# Patient Record
Sex: MALE | Race: WHITE | NOT HISPANIC OR LATINO | Employment: FULL TIME | URBAN - METROPOLITAN AREA
[De-identification: names, ages, dates, MRNs, and addresses within clinical notes are randomized per-mention and may not be internally consistent; named-entity substitution may affect disease eponyms.]

---

## 2021-05-07 ENCOUNTER — TELEPHONE (OUTPATIENT)
Dept: OBGYN CLINIC | Facility: MEDICAL CENTER | Age: 26
End: 2021-05-07

## 2021-05-07 ENCOUNTER — TELEPHONE (OUTPATIENT)
Dept: OBGYN CLINIC | Facility: HOSPITAL | Age: 26
End: 2021-05-07

## 2021-05-07 NOTE — TELEPHONE ENCOUNTER
Dr Miles Howe    # [phone]    New patient with a right hip labrum tear  DOI: 3/16  He is 25 would you be willing to see him? Please advise

## 2021-05-07 NOTE — TELEPHONE ENCOUNTER
Called pt to set up an appointment with Dr Yarely Chowdhury  Called [phone] and asked for the patient by name  Person answering phone stated that he was just a stranger he was not [facility] and I had the wrong number

## 2021-05-07 NOTE — TELEPHONE ENCOUNTER
Patients workers comp carrier called in wanting to make an appointment for this patient to be seen but could not answer COVID questions will call back with patient on the line

## 2021-05-12 NOTE — TELEPHONE ENCOUNTER
Janet Olvera, Nurse , called in conference with patient to schedule appointment with Dr Kapoor Roles  Patient's phone number has been updated in [address]  Appointment schedule for Monday, 5/17

## 2021-05-17 ENCOUNTER — APPOINTMENT (OUTPATIENT)
Dept: RADIOLOGY | Facility: MEDICAL CENTER | Age: 26
End: 2021-05-17
Payer: OTHER MISCELLANEOUS

## 2021-05-17 ENCOUNTER — OFFICE VISIT (OUTPATIENT)
Dept: OBGYN CLINIC | Facility: MEDICAL CENTER | Age: 26
End: 2021-05-17
Payer: OTHER MISCELLANEOUS

## 2021-05-17 VITALS
DIASTOLIC BLOOD PRESSURE: 82 MMHG | WEIGHT: 178.2 LBS | TEMPERATURE: 98 F | BODY MASS INDEX: 34.99 KG/M2 | SYSTOLIC BLOOD PRESSURE: 130 MMHG | HEART RATE: 79 BPM | HEIGHT: 60 IN

## 2021-05-17 DIAGNOSIS — S73.191A TEAR OF RIGHT ACETABULAR LABRUM, INITIAL ENCOUNTER: Primary | ICD-10-CM

## 2021-05-17 DIAGNOSIS — M25.551 PAIN IN RIGHT HIP: ICD-10-CM

## 2021-05-17 PROCEDURE — 73502 X-RAY EXAM HIP UNI 2-3 VIEWS: CPT

## 2021-05-17 PROCEDURE — 99203 OFFICE O/P NEW LOW 30 MIN: CPT | Performed by: ORTHOPAEDIC SURGERY

## 2021-05-17 NOTE — LETTER
May 17, 2021     Patient: González Davis   YOB: 1995   Date of Visit: 5/17/2021       To Whom it May Concern:    González Child is under my professional care  He was seen in my office on 5/17/2021  He may return to work on on sedentary duty only at this time       If you have any questions or concerns, please don't hesitate to call           Sincerely,          Kojo Rico DO        CC: No Recipients

## 2021-05-17 NOTE — PROGRESS NOTES
Assessment/Plan     1  Tear of right acetabular labrum, initial encounter    2  Pain in right hip      Orders Placed This Encounter   Procedures    XR hip/pelv 2-3 vws right if performed    Ambulatory referral to Physical Therapy    Ambulatory referral to Orthopedic Surgery     · Patient has right hip labrum tear  · Discussed with patient conservative treatments: physical therapy, medications and steroid injection  · Physical therapy order was given out today for the right hip   Prescribed patient Diclofenac prn pain, medications warnings were reviewed with patient  Add in Tylenol 1000 mg every 8 hours as needed for pain  Do not exceed 3000 mg day   Referred patient to Dr Iram Garcia   · He declined right hip steroid injection order   · Work not given out today : may return to work on sedentary duty  Return in about 6 weeks (around 6/28/2021) for Right hip pain  with Dr Iram Garcia   I answered all of the patient's questions during the visit and provided education of the patient's condition during the visit  The patient verbalized understanding of the information given and agrees with the plan  This note was dictated using Rem[employer] software  It may contain errors including improperly dictated words  Please contact physician directly for any questions  History of Present Illness   Chief complaint:   Chief Complaint   Patient presents with    Right Hip - Pain       HPI: Frandy Weldon is a 31 y o  male that c/o right hip pain  He was referred by his [employer]  He states he had injury at work on March 16th and 2021 (  [address])  Patient states 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 and his right hip shifted to the right and felt a sharp tear /pop sensation  Patient states a week later he was seen at [facility] and had x-rays taken of the right hip which showed no acute fractures and was placed at work for two weeks    He also was ordered physical therapy and states he went for three weeks and states that cause increased pain  Patient return back to Urgent Care at [facility] and had MRI right hip ordered which showed a labral tear  He states he is having a sharp pain that comes and goes over the anterior ( deep) aspect of the right hip  He denies any groin pain or radiating leg pain  Pain is worse with standing, walking, and going down steps  He denies any pain at rest   He previously was taking ibuprofen 800 mg with some relief  He is now taking over-the-counter Motrin with some relief  He is currently at a work per [facility] urgent care physician  He has no history having any injections or surgeries on the right hip  ROS:    See HPI for musculoskeletal review  All other systems reviewed are negative     Historical Information   No past medical history on file  No past surgical history on file  Social History   Social History     Substance and Sexual Activity   Alcohol Use Not on file     Social History     Substance and Sexual Activity   Drug Use Not on file     Social History     Tobacco Use   Smoking Status Not on file     Family History: No family history on file  No current outpatient medications on file prior to visit  No current facility-administered medications on file prior to visit  No Known Allergies    Objective   Vitals: Blood pressure 130/82, pulse 79, temperature 98 °F (36 7 °C), height 5' (1 524 m), weight 80 8 kg (178 lb 3 2 oz)  ,Body mass index is 34 8 kg/m²  PE:  AAOx 3  WDWN  Hearing intact, no drainage from eyes  Regular rate  no audible wheezing  no abdominal distension  LE compartments soft, skin intact    right hip:   No dislocation/deformity   Neg  SLR   Neg  Stinchfield  ROM: FF 0-140  ER 0-60 with orly pain   IR 0-30 with no pain   Neg  Sammy Test  +  Impingement test  No TTP over greater trochanter  Abduction: 5/5/ pain with resistant abduction   Neg   Sunita's test  No TTP over SIJ      Back:    No TTP over lumbar spinous processes, paraspinal musculature  SLR: Neg        Imaging Studies: I have personally reviewed pertinent films in PACS  righthip:   No acute osseous abnormality,   Undercoverage  of the femoral head suggesting mild hip dysplasia    MRI right hip: labral tear       Scribe Attestation    I,:  Kai Huggins am acting as a scribe while in the presence of the attending physician :       I,:  Jim Guajardo,  personally performed the services described in this documentation    as scribed in my presence :

## 2021-05-18 ENCOUNTER — TELEPHONE (OUTPATIENT)
Dept: OBGYN CLINIC | Facility: MEDICAL CENTER | Age: 26
End: 2021-05-18

## 2021-05-18 NOTE — TELEPHONE ENCOUNTER
Patient sees Dr Osmel Stokes at [employer] on work notes, faxed to [phone] completed

## 2021-05-19 ENCOUNTER — TELEPHONE (OUTPATIENT)
Dept: OBGYN CLINIC | Facility: HOSPITAL | Age: 26
End: 2021-05-19

## 2021-05-19 NOTE — TELEPHONE ENCOUNTER
Patient states job is requesting new note with specific duties he can perform  He installs billboards for a living  They want to know exactly he can or can't do  Please call when ready  They are having trouble finding work for him and he would have to stay home otherwise

## 2021-05-19 NOTE — TELEPHONE ENCOUNTER
Spoke with patient  Provided new work note  Margaret Tadeo, can you please send to his email [email or web address]  thanks!

## 2021-05-20 ENCOUNTER — TELEPHONE (OUTPATIENT)
Dept: OBGYN CLINIC | Facility: HOSPITAL | Age: 26
End: 2021-05-20

## 2021-05-20 NOTE — TELEPHONE ENCOUNTER
Patient sees Dr Sandhya Rowland is calling in from [facility] rehab wanting to get this patients PT script faxed over to them as soon as possible, the patient is scheduled to come in to them tomorrow  Please fax           Fax# [phone]

## 2021-06-22 ENCOUNTER — OFFICE VISIT (OUTPATIENT)
Dept: OBGYN CLINIC | Facility: MEDICAL CENTER | Age: 26
End: 2021-06-22
Payer: OTHER MISCELLANEOUS

## 2021-06-22 VITALS
HEART RATE: 88 BPM | HEIGHT: 60 IN | DIASTOLIC BLOOD PRESSURE: 79 MMHG | WEIGHT: 178 LBS | SYSTOLIC BLOOD PRESSURE: 144 MMHG | BODY MASS INDEX: 34.95 KG/M2

## 2021-06-22 DIAGNOSIS — S73.191A TEAR OF RIGHT ACETABULAR LABRUM, INITIAL ENCOUNTER: ICD-10-CM

## 2021-06-22 DIAGNOSIS — M25.551 PAIN IN RIGHT HIP: Primary | ICD-10-CM

## 2021-06-22 PROCEDURE — 99214 OFFICE O/P EST MOD 30 MIN: CPT | Performed by: ORTHOPAEDIC SURGERY

## 2021-06-22 NOTE — PROGRESS NOTES
Orthopaedic Surgery - Office Note  Robert Harris (22 y o  male)   : 1995   MRN: [id number]  Encounter Date: 2021    Chief Complaint   Patient presents with    Right Hip - Pain       Assessment / Plan  Right hip pain, concern for labral tear    · MRI Arthrogram of right hip   · Activity as tolerated  · Continue with anti-inflammatories or Tylenol prn  · Continue with HEP as tolerated  Return for after MRI for results  History of Present Illness  Robert Harris is a 31 y o  male who presents for evaluation of right hip pain  He has been referred to my office from Dr Cleo Servin for further evaluation of right hip pain  He began having pain on 3/16/2021 when he was at work  Patient states 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 and his right hip shifted to the right and felt a sharp tear /pop sensation  He has been having pain since then  He did complete PT which did provide him some relief in symptoms  He has been compliant with his HEP  He has increased pain with prolonged walking or standing, he describes the pain as being in his groin  He states the pain is sharp and feels deep in his hip  He has had to modify his ADL's due to pain  He denies any previous injury or radiating symptoms  Review of Systems  Pertinent items are noted in HPI  All other systems were reviewed and are negative  Physical Exam  /79   Pulse 88   Ht 5' (1 524 m)   Wt 80 7 kg (178 lb)   BMI 34 76 kg/m²   Cons: Appears well  No apparent distress  Psych: Alert  Oriented x3  Mood and affect normal   Eyes: PERRLA, EOMI  Resp: Normal effort  No audible wheezing or stridor  CV: Palpable pulse  No discernable arrhythmia  No LE edema  Lymph:  No palpable cervical, axillary, or inguinal lymphadenopathy  Skin: Warm  No palpable masses  No visible lesions  Neuro: Normal muscle tone  Normal and symmetric DTR's       Right Hip Exam  Alignment / Posture:  Normal resting hip posture  Inspection:  No swelling  No erythema  No ecchymosis  Palpation:  mild groin tenderness  No effusion  ROM:  Hip Flexion 120  Hip ER 60  Hip IR 40  Strength:  5/5 hip flexors and abductors  Stability:  (-) Logroll  Tests:  (+) Stinchfield  (+) FADDIR  (-) CLOVER  Neurovascular:  Sensation intact in DP/SP/Tapia/Sa/T nerve distributions  2+ DP & PT pulses  Gait:  Heel-to-toe  Studies Reviewed  I have personally reviewed pertinent films in PACS  MRI of right hip - images from an outside provider show possible larbal tear, CAM deformity, and acetabular dysplasia with lateral CEA of 20 degrees    Procedures  No procedures today  Medical, Surgical, Family, and Social History  The patient's medical history, family history, and social history, were reviewed and updated as appropriate  History reviewed  No pertinent past medical history  History reviewed  No pertinent surgical history  History reviewed  No pertinent family history  Social History     Occupational History    Not on file   Tobacco Use    Smoking status: Never Smoker    Smokeless tobacco: Never Used   Substance and Sexual Activity    Alcohol use: Not on file    Drug use: Not on file    Sexual activity: Not on file       No Known Allergies    No current outpatient medications on file        Rodger Fregoso    Scribe Attestation    I,:  Rodger Fregoso am acting as a scribe while in the presence of the attending physician :       I,:  Flower Boateng MD personally performed the services described in this documentation    as scribed in my presence :

## 2021-06-23 ENCOUNTER — TELEPHONE (OUTPATIENT)
Dept: OBGYN CLINIC | Facility: OTHER | Age: 26
End: 2021-06-23

## 2021-06-23 NOTE — TELEPHONE ENCOUNTER
Stephanie Dobbins @ [facility] is requesting we fax Office Notes, Referrals and Work Letter  Stephanie Shilpi is stating she faxed Casualty Letter , Job Description and a Light Duty Form on 0543 , I do not see it uploaded in [facility] , I requested her to please fax them again  Please fax her requested notes      Fax # [phone]  Attn : Claim # [id number]    C/b #  [phone]    Thank you

## 2021-06-30 ENCOUNTER — TELEPHONE (OUTPATIENT)
Dept: OBGYN CLINIC | Facility: MEDICAL CENTER | Age: 26
End: 2021-06-30

## 2021-06-30 NOTE — TELEPHONE ENCOUNTER
Patient sees Dr Johnny Grover at [address] [facility] requesting the script for the MRI on 6/22/2021 to be faxed to her  Please fax to [phone]    Thanks

## 2021-07-06 ENCOUNTER — TELEPHONE (OUTPATIENT)
Dept: OBGYN CLINIC | Facility: MEDICAL CENTER | Age: 26
End: 2021-07-06

## 2021-07-06 NOTE — TELEPHONE ENCOUNTER
[facility] called in requesting OVN to be faxed           Fax per request to #[phone] [facility]

## 2021-07-12 NOTE — NURSING NOTE
Call placed to patient to discuss upcoming appointment at [place] [address] radiology department and complete consultation with patient  Patient is having right hip MRI arthrogram utilizing fluoroscopy  Reviewed patient's allergies, no current anticoagulant medication present, also discussed the pre and post procedure expectations  Reminded patient of location and time expected for procedure, Patient expressed understanding by verbalizing and repeating instructions

## 2021-07-19 ENCOUNTER — HOSPITAL ENCOUNTER (OUTPATIENT)
Dept: RADIOLOGY | Facility: HOSPITAL | Age: 26
Discharge: HOME | End: 2021-07-19
Attending: ORTHOPAEDIC SURGERY
Payer: OTHER MISCELLANEOUS

## 2021-07-19 ENCOUNTER — HOSPITAL ENCOUNTER (OUTPATIENT)
Dept: RADIOLOGY | Facility: HOSPITAL | Age: 26
Discharge: HOME | End: 2021-07-19
Attending: ORTHOPAEDIC SURGERY | Admitting: RADIOLOGY
Payer: OTHER MISCELLANEOUS

## 2021-07-19 DIAGNOSIS — S73.191A TEAR OF RIGHT ACETABULAR LABRUM, INITIAL ENCOUNTER: ICD-10-CM

## 2021-07-19 DIAGNOSIS — M25.551 PAIN IN RIGHT HIP: ICD-10-CM

## 2021-07-19 PROCEDURE — 73722 MRI JOINT OF LWR EXTR W/DYE: CPT

## 2021-07-19 PROCEDURE — 77002 NEEDLE LOCALIZATION BY XRAY: CPT

## 2021-07-19 PROCEDURE — 27095 INJECTION FOR HIP X-RAY: CPT

## 2021-07-19 PROCEDURE — G1004 CDSM NDSC: HCPCS

## 2021-07-19 PROCEDURE — A9585 GADOBUTROL INJECTION: HCPCS | Performed by: ORTHOPAEDIC SURGERY

## 2021-07-19 RX ORDER — SODIUM CHLORIDE 9 MG/ML
50 INJECTION INTRAVENOUS
Status: COMPLETED | OUTPATIENT
Start: 2021-07-19 | End: 2021-07-19

## 2021-07-19 RX ORDER — LIDOCAINE HYDROCHLORIDE 10 MG/ML
10 INJECTION, SOLUTION EPIDURAL; INFILTRATION; INTRACAUDAL; PERINEURAL
Status: COMPLETED | OUTPATIENT
Start: 2021-07-19 | End: 2021-07-19

## 2021-07-19 RX ADMIN — SODIUM CHLORIDE 15 ML: 9 INJECTION, SOLUTION INTRAMUSCULAR; INTRAVENOUS; SUBCUTANEOUS at 13:40

## 2021-07-19 RX ADMIN — IOHEXOL 5 ML: 300 INJECTION, SOLUTION INTRAVENOUS at 13:40

## 2021-07-19 RX ADMIN — LIDOCAINE HYDROCHLORIDE 5 ML: 10 INJECTION, SOLUTION EPIDURAL; INFILTRATION; INTRACAUDAL; PERINEURAL at 13:44

## 2021-07-19 RX ADMIN — GADOBUTROL 2 ML: 604.72 INJECTION INTRAVENOUS at 13:40

## 2021-08-03 ENCOUNTER — OFFICE VISIT (OUTPATIENT)
Dept: OBGYN CLINIC | Facility: MEDICAL CENTER | Age: 26
End: 2021-08-03
Payer: OTHER MISCELLANEOUS

## 2021-08-03 VITALS
BODY MASS INDEX: 34.95 KG/M2 | DIASTOLIC BLOOD PRESSURE: 80 MMHG | SYSTOLIC BLOOD PRESSURE: 143 MMHG | HEART RATE: 89 BPM | HEIGHT: 60 IN | WEIGHT: 178 LBS

## 2021-08-03 DIAGNOSIS — Q65.89 ACETABULAR DYSPLASIA: Primary | ICD-10-CM

## 2021-08-03 DIAGNOSIS — S73.191A TEAR OF RIGHT ACETABULAR LABRUM, INITIAL ENCOUNTER: ICD-10-CM

## 2021-08-03 PROCEDURE — 99213 OFFICE O/P EST LOW 20 MIN: CPT | Performed by: ORTHOPAEDIC SURGERY

## 2021-08-03 NOTE — PROGRESS NOTES
Orthopaedic Surgery - Office Note  Sheela Friedman (20 y o  male)   : 1995   MRN: [id number]  Encounter Date: 8/3/2021    Chief Complaint   Patient presents with    Right Hip - Follow-up       Assessment / Plan  Right hip superior and anterior superior labral tear, mild acetabular dysplasia     · Patient understands because of his dysplasia, an arthroscopy for labral repair may not relief his symptoms because of the dysplasia  Patient has failed conservative treatment as this time  So a referral was given to consult with [facility] about an osteotomy  · I am happy to see the patient back on an as needed bases  · Continue with HEP as tolerated  · Continue activity as tolerated  Return if symptoms worsen or fail to improve  History of Present Illness  Sheela Friedman is a 31 y o  male who presents for follow up right hip pain and MRI results  He denies any change in symptoms since his previous visit  He began having pain on 3/16/2021 when he was at work  Patient states 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 and his right hip shifted to the right and felt a sharp tear /pop sensation  He has been having pain since then  He did complete PT which did provide him some relief in symptoms  He has been compliant with his HEP  He has increased pain with prolonged walking or standing, he describes the pain as being in his groin  He states the pain is sharp and feels deep in his hip  He has had to modify his ADL's due to pain  He denies any previous injury or radiating symptoms  Review of Systems  Pertinent items are noted in HPI  All other systems were reviewed and are negative  Physical Exam  /80   Pulse 89   Ht 5' (1 524 m)   Wt 80 7 kg (178 lb)   BMI 34 76 kg/m²   Cons: Appears well  No apparent distress  Psych: Alert  Oriented x3  Mood and affect normal   Eyes: PERRLA, EOMI  Resp: Normal effort  No audible wheezing or stridor  CV: Palpable pulse    No discernable arrhythmia  No LE edema  Lymph:  No palpable cervical, axillary, or inguinal lymphadenopathy  Skin: Warm  No palpable masses  No visible lesions  Neuro: Normal muscle tone  Normal and symmetric DTR's  Right Hip Exam  Alignment / Posture:  Normal resting hip posture  Inspection:  No swelling  No erythema  No ecchymosis  Palpation:  mild groin tenderness  ROM:  Hip Flexion 120  Hip ER 60  Hip IR 30  Strength:  5/5 hip flexors and abductors  Stability:  No objective hip instability  Tests:  (+) Yumiko  (+) TANYA  (+) CLOVER  Neurovascular:  Sensation intact in DP/SP/Tapia/Sa/T nerve distributions  2+ DP & PT pulses  Gait:  Normal     Studies Reviewed  I have personally reviewed pertinent films in PACS  MRI arthrogram of right hip - images from 7/19/2021 which show  Superior and anterior superior labral tear with cam morphology  Mild acetabular dysplasia    Procedures  No procedures today  Medical, Surgical, Family, and Social History  The patient's medical history, family history, and social history, were reviewed and updated as appropriate  History reviewed  No pertinent past medical history  Past Surgical History:   Procedure Laterality Date    FL INJECTION RIGHT HIP (ARTHROGRAM)  7/19/2021       No family history on file  Social History     Occupational History    Not on file   Tobacco Use    Smoking status: Never Smoker    Smokeless tobacco: Never Used   Substance and Sexual Activity    Alcohol use: Not on file    Drug use: Not on file    Sexual activity: Not on file       No Known Allergies    No current outpatient medications on file        Radha Last    Scribe Attestation    I,:  Radha Last am acting as a scribe while in the presence of the attending physician :       I,:  Elizabeth Kilgore MD personally performed the services described in this documentation    as scribed in my presence :

## 2021-08-03 NOTE — LETTER
August 3, 2021     Patient: Patsy Pace   YOB: 1995   Date of Visit: 8/3/2021       To Whom it May Concern:    Patsy Pace is under my professional care  He was seen in my office on 8/3/2021  He should continue to remain out of work at this time  If you have any questions or concerns, please don't hesitate to call           Sincerely,          Lynne Alonzo MD        CC: No Recipients

## 2023-02-02 ENCOUNTER — OFFICE VISIT (OUTPATIENT)
Dept: URGENT CARE | Age: 28
End: 2023-02-02

## 2023-02-02 VITALS
HEART RATE: 89 BPM | RESPIRATION RATE: 18 BRPM | OXYGEN SATURATION: 98 % | TEMPERATURE: 97.9 F | SYSTOLIC BLOOD PRESSURE: 120 MMHG | DIASTOLIC BLOOD PRESSURE: 76 MMHG

## 2023-02-02 DIAGNOSIS — B96.89 BACTERIAL CONJUNCTIVITIS OF BOTH EYES: Primary | ICD-10-CM

## 2023-02-02 DIAGNOSIS — H10.9 BACTERIAL CONJUNCTIVITIS OF BOTH EYES: Primary | ICD-10-CM

## 2023-02-02 RX ORDER — BUPRENORPHINE AND NALOXONE 8; 2 MG/1; MG/1
FILM, SOLUBLE BUCCAL; SUBLINGUAL
COMMUNITY
Start: 2023-01-31

## 2023-02-02 RX ORDER — AMOXICILLIN 500 MG/1
CAPSULE ORAL
COMMUNITY
Start: 2023-01-31

## 2023-02-02 RX ORDER — TOBRAMYCIN 3 MG/ML
1 SOLUTION/ DROPS OPHTHALMIC
Qty: 5 ML | Refills: 0 | Status: SHIPPED | OUTPATIENT
Start: 2023-02-02 | End: 2023-02-09

## 2023-02-02 NOTE — PROGRESS NOTES
NAME: Taylor Carlin is a 28 y o  male  : 1995    MRN: [id number]    /76   Pulse 89   Temp 97 9 °F (36 6 °C)   Resp 18   SpO2 98%     4:47 PM    Assessment and Plan   Bacterial conjunctivitis of both eyes [H10 9, B96 89]  1  Bacterial conjunctivitis of both eyes  tobramycin (TOBREX) 0 3 % SOLN          Kaur Frazier was seen today for eye problem  Diagnoses and all orders for this visit:    Bacterial conjunctivitis of both eyes  -     tobramycin (TOBREX) 0 3 % SOLN; Administer 1 drop to both eyes every 4 (four) hours while awake for 7 days     pt aware to continue the amoxicillin as directed by PCP    Patient Instructions   Patient Instructions   Take the antibiotic eye drops as directed  No work/school for 24 hours   Dont touch your face   Wash hands often  This is a contagious infection at this time  If symptoms of eye pain or change in vision occur go for further evaluation with your eye dr or to the ER  Take zyrtec or allegra daily  Use flonase 1-2 sprays in each nare daily   Use nasal saline to the nose,   Use humidifer in room  Symptoms worsen go to ER  Rest    Follow up with PCP        Proceed to the nearest ER if symptoms worsen, Follow up with your PCP  Continue to social distance, wash your hands, and wear your masks  Please continue to follow the CDC  gov guidelines daily for they are subject to change on COVID-19    Chief Complaint     Chief Complaint   Patient presents with   • Eye Problem     Patient relates he is on antibiotics for strep throat  States two days ago started left eye redness, today both eye red and dry feeling         History of Present Illness     28year-old patient here today with bilateral eye pain irritation and redness  Pain started in the left eye started in the right eye yesterday  All symptoms started 3 days ago  His son has been recently sick with strep and this patient has started amoxicillin 3 days ago from the family doctor    As he was leaving the office the  told him he should return to have his eye looked at  He did not think it was a big deal however it worsened  Patient denies wearing any contacts  Review of Systems   Review of Systems   Constitutional: Negative for activity change, fatigue and fever  HENT: Positive for congestion and sore throat  Eyes: Positive for photophobia, pain, discharge, redness and itching  Negative for visual disturbance  Respiratory: Negative  Endocrine: Negative  Current Medications       Current Outpatient Medications:   •  tobramycin (TOBREX) 0 3 % SOLN, Administer 1 drop to both eyes every 4 (four) hours while awake for 7 days, Disp: 5 mL, Rfl: 0  •  amoxicillin (AMOXIL) 500 mg capsule, TAKE ONE CAPSULE BY MOUTH THREE TIMES A DAY UNTIL GONE, Disp: , Rfl:   •  buprenorphine-naloxone (Suboxone) 8-2 mg, TAKE ONE FILM  UNDER THE TONGUE DAILY, Disp: , Rfl:     Current Allergies     Allergies as of 02/02/2023 - Reviewed 02/02/2023   Allergen Reaction Noted   • Quinolones Angioedema 02/25/2015              History reviewed  No pertinent past medical history  Past Surgical History:   Procedure Laterality Date   • FL INJECTION RIGHT HIP (ARTHROGRAM)  7/19/2021       History reviewed  No pertinent family history  Medications have been verified  The following portions of the patient's history were reviewed and updated as appropriate: allergies, current medications, past family history, past medical history, past social history, past surgical history and problem list     Objective   /76   Pulse 89   Temp 97 9 °F (36 6 °C)   Resp 18   SpO2 98%      Physical Exam     Physical Exam  Constitutional:       General: He is not in acute distress  Appearance: Normal appearance  He is not ill-appearing  HENT:      Head: Normocephalic        Right Ear: Hearing, tympanic membrane, ear canal and external ear normal       Left Ear: Hearing, tympanic membrane, ear canal and external ear normal       Nose: Nose normal       Right Turbinates: Swollen  Left Turbinates: Swollen  Mouth/Throat:      Lips: Pink  Mouth: Mucous membranes are moist       Tongue: No lesions  Palate: No mass  Pharynx: Oropharynx is clear  Uvula midline  Cardiovascular:      Rate and Rhythm: Normal rate and regular rhythm  Pulmonary:      Effort: Pulmonary effort is normal       Breath sounds: Normal breath sounds and air entry  No decreased breath sounds or wheezing  Neurological:      Mental Status: He is alert  Note: Portions of this record may have been created with voice recognition software  Occasional wrong word or "sound a like" substitutions may have occurred due to the inherent limitations of voice recognition software  Please read the chart carefully and recognize, using context, where substitutions have occurred  HAYLIE Hunter

## 2024-03-01 ENCOUNTER — OFFICE VISIT (OUTPATIENT)
Dept: URGENT CARE | Age: 29
End: 2024-03-01
Payer: COMMERCIAL

## 2024-03-01 VITALS
SYSTOLIC BLOOD PRESSURE: 124 MMHG | DIASTOLIC BLOOD PRESSURE: 74 MMHG | TEMPERATURE: 97.3 F | HEART RATE: 78 BPM | OXYGEN SATURATION: 98 % | RESPIRATION RATE: 18 BRPM

## 2024-03-01 DIAGNOSIS — J02.9 SORE THROAT: Primary | ICD-10-CM

## 2024-03-01 DIAGNOSIS — R05.1 ACUTE COUGH: ICD-10-CM

## 2024-03-01 LAB
S PYO AG THROAT QL: NEGATIVE
SARS-COV-2 AG UPPER RESP QL IA: NEGATIVE
VALID CONTROL: NORMAL

## 2024-03-01 PROCEDURE — 99213 OFFICE O/P EST LOW 20 MIN: CPT

## 2024-03-01 PROCEDURE — 87880 STREP A ASSAY W/OPTIC: CPT

## 2024-03-01 PROCEDURE — 87070 CULTURE OTHR SPECIMN AEROBIC: CPT

## 2024-03-01 PROCEDURE — 87811 SARS-COV-2 COVID19 W/OPTIC: CPT

## 2024-03-01 RX ORDER — BENZONATATE 100 MG/1
100 CAPSULE ORAL 3 TIMES DAILY PRN
Qty: 20 CAPSULE | Refills: 0 | Status: SHIPPED | OUTPATIENT
Start: 2024-03-01

## 2024-03-01 NOTE — PATIENT INSTRUCTIONS
Use benzonatate as directed as needed for cough.   Hydration and rest.  Recommend throat lozenges, anesthetic spray such as chloraseptic, and gargling warm salt water for throat irritation.   Throat culture sent; results will appear in mychart.   Acetaminophen and ibuprofen for pain relief and fever reduction.   PCP follow up in 3-5 days.   Go to an emergency department if difficulty breathing occurs or if symptoms worsen.

## 2024-03-01 NOTE — PROGRESS NOTES
[facility]        NAME: Lester Levi is a 28 y.o. male  : 1995    MRN: [id number]  DATE: 2024  TIME: 12:17 PM      Assessment and Plan     Sore throat [J02.9]  1. Sore throat          Rapid strep negative. Throat culture sent. Will hold on antibiotics at this time  Rapid covid negative.     Patient Instructions     .Use benzonatate as directed as needed for cough.   Hydration and rest.  Recommend throat lozenges, anesthetic spray such as chloraseptic, and gargling warm salt water for throat irritation.   Throat culture sent; results will appear in mychart.   Acetaminophen and ibuprofen for pain relief and fever reduction.   PCP follow up in 3-5 days.   Go to an emergency department if difficulty breathing occurs or if symptoms worsen.    Chief Complaint     Chief Complaint   Patient presents with    Sore Throat     Sore throat starting 4 days ago, has been worsening. Fatigued.          History of Present Illness     Patient is a 28-year-old male who presents with cough and sore throat for 4 days. Reports congestion. Reports voice change. Reports light sensitivity. Denies fever. Denies asthma history. States that he does vape.     Sore Throat   Associated symptoms include congestion and coughing.       Review of Systems     Review of Systems   Constitutional:  Negative for chills and fever.   HENT:  Positive for congestion, sore throat and voice change.    Respiratory:  Positive for cough.    All other systems reviewed and are negative.        Current Medications       Current Outpatient Medications:     amoxicillin (AMOXIL) 500 mg capsule, TAKE ONE CAPSULE BY MOUTH THREE TIMES A DAY UNTIL GONE (Patient not taking: Reported on 3/1/2024), Disp: , Rfl:     buprenorphine-naloxone (Suboxone) 8-2 mg, TAKE ONE FILM  UNDER THE TONGUE DAILY (Patient not taking: Reported on 3/1/2024), Disp: , Rfl:     Current Allergies     Allergies as of 2024 - Reviewed 2023   Allergen Reaction Noted     Quinolones Angioedema 02/25/2015              The following portions of the patient's history were reviewed and updated as appropriate: allergies, current medications, past family history, past medical history, past social history, past surgical history and problem list.     No past medical history on file.    Past Surgical History:   Procedure Laterality Date    FL INJECTION RIGHT HIP (ARTHROGRAM)  7/19/2021       No family history on file.      Medications have been verified.        Objective     /74   Pulse 78   Temp (!) 97.3 °F (36.3 °C)   Resp 18   SpO2 98%   No LMP for male patient.         Physical Exam     Physical Exam  Vitals and nursing note reviewed.   Constitutional:       General: He is not in acute distress.     Appearance: Normal appearance. He is not ill-appearing, toxic-appearing or diaphoretic.   HENT:      Right Ear: Tympanic membrane, ear canal and external ear normal.      Left Ear: Tympanic membrane, ear canal and external ear normal.      Nose: Congestion present.      Mouth/Throat:      Lips: Pink.      Mouth: Mucous membranes are moist.      Pharynx: Oropharynx is clear. Uvula midline. No pharyngeal swelling, oropharyngeal exudate, posterior oropharyngeal erythema or uvula swelling.      Tonsils: No tonsillar exudate or tonsillar abscesses. 1+ on the right. 1+ on the left.   Cardiovascular:      Rate and Rhythm: Normal rate.      Pulses: Normal pulses.      Heart sounds: Normal heart sounds, S1 normal and S2 normal.   Pulmonary:      Effort: Pulmonary effort is normal.      Breath sounds: Normal breath sounds and air entry. No stridor, decreased air movement or transmitted upper airway sounds. No decreased breath sounds, wheezing, rhonchi or rales.   Skin:     General: Skin is warm.      Capillary Refill: Capillary refill takes less than 2 seconds.   Neurological:      General: No focal deficit present.      Mental Status: He is alert.   Psychiatric:         Mood and Affect: Mood  normal.         Behavior: Behavior normal.         Thought Content: Thought content normal.         Judgment: Judgment normal.        [As Noted in HPI] : as noted in HPI [Negative] : Heme/Lymph

## 2024-03-03 LAB — BACTERIA THROAT CULT: NORMAL
